# Patient Record
Sex: FEMALE | Race: WHITE | NOT HISPANIC OR LATINO | URBAN - METROPOLITAN AREA
[De-identification: names, ages, dates, MRNs, and addresses within clinical notes are randomized per-mention and may not be internally consistent; named-entity substitution may affect disease eponyms.]

---

## 2021-10-04 ENCOUNTER — PREPPED CHART (OUTPATIENT)
Dept: URBAN - METROPOLITAN AREA CLINIC 20 | Facility: CLINIC | Age: 71
End: 2021-10-04

## 2022-04-07 ASSESSMENT — TONOMETRY
OS_IOP_MMHG: 9
OD_IOP_MMHG: 8

## 2022-04-07 ASSESSMENT — VISUAL ACUITY
OS_SC: 20/20
OD_SC: 20/20

## 2022-04-11 ENCOUNTER — CONSULTATION/EVALUATION (OUTPATIENT)
Dept: URBAN - METROPOLITAN AREA CLINIC 20 | Facility: CLINIC | Age: 72
End: 2022-04-11

## 2022-04-11 DIAGNOSIS — H16.223: ICD-10-CM

## 2022-04-11 PROCEDURE — 99214 OFFICE O/P EST MOD 30 MIN: CPT

## 2022-04-11 ASSESSMENT — VISUAL ACUITY
OD_SC: 20/25+2
OS_SC: 20/20

## 2022-04-11 ASSESSMENT — TONOMETRY
OS_IOP_MMHG: 10
OD_IOP_MMHG: 9

## 2022-08-01 NOTE — PATIENT DISCUSSION
Pt advised to avoid CL wear for 1 week and to throw out old CL's and case.  Given a new case and CL trial.

## 2022-08-19 NOTE — PATIENT DISCUSSION
Gave pt -6.75 sph OU in [employer]  due to pt having a hard time accommodating to the modify mono vision. Told pt to call us back later this week and to give us an update. - Kusum 8/31/22.

## 2023-01-30 ENCOUNTER — FOLLOW UP (OUTPATIENT)
Dept: URBAN - METROPOLITAN AREA CLINIC 20 | Facility: CLINIC | Age: 73
End: 2023-01-30

## 2023-01-30 DIAGNOSIS — H26.493: ICD-10-CM

## 2023-01-30 PROCEDURE — 92014 COMPRE OPH EXAM EST PT 1/>: CPT

## 2023-01-30 ASSESSMENT — TONOMETRY
OD_IOP_MMHG: 11
OS_IOP_MMHG: 10

## 2023-01-30 ASSESSMENT — KERATOMETRY
OS_AXISANGLE_DEGREES: 34
OS_AXISANGLE2_DEGREES: 124
OD_AXISANGLE_DEGREES: 0
OS_K2POWER_DIOPTERS: 44
OD_AXISANGLE2_DEGREES: 90
OD_K1POWER_DIOPTERS: 44
OD_K2POWER_DIOPTERS: 44
OS_K1POWER_DIOPTERS: 43.75

## 2023-01-30 ASSESSMENT — VISUAL ACUITY
OS_SC: 20/20
OU_SC: 20/20-1
OD_SC: 20/20-1

## 2023-07-24 ENCOUNTER — FOLLOW UP (OUTPATIENT)
Dept: URBAN - METROPOLITAN AREA CLINIC 20 | Facility: CLINIC | Age: 73
End: 2023-07-24

## 2023-07-24 DIAGNOSIS — H43.813: ICD-10-CM

## 2023-07-24 DIAGNOSIS — H16.223: ICD-10-CM

## 2023-07-24 DIAGNOSIS — H35.363: ICD-10-CM

## 2023-07-24 PROCEDURE — 99213 OFFICE O/P EST LOW 20 MIN: CPT

## 2023-07-24 RX ORDER — CARBOXYMETHYLCELLULOSE SODIUM 10 MG/ML: 1 GEL OPHTHALMIC

## 2023-07-24 ASSESSMENT — KERATOMETRY
OD_K2POWER_DIOPTERS: 44
OS_K2POWER_DIOPTERS: 44
OS_AXISANGLE2_DEGREES: 124
OD_AXISANGLE_DEGREES: 0
OS_K1POWER_DIOPTERS: 43.75
OS_AXISANGLE_DEGREES: 34
OD_K1POWER_DIOPTERS: 44
OD_AXISANGLE2_DEGREES: 90

## 2023-07-24 ASSESSMENT — VISUAL ACUITY
OS_SC: 20/40
OD_SC: 20/25

## 2023-07-24 ASSESSMENT — TONOMETRY
OS_IOP_MMHG: 15
OD_IOP_MMHG: 13

## 2023-09-25 ENCOUNTER — FOLLOW UP (OUTPATIENT)
Dept: URBAN - METROPOLITAN AREA CLINIC 20 | Facility: CLINIC | Age: 73
End: 2023-09-25

## 2023-09-25 DIAGNOSIS — H16.223: ICD-10-CM

## 2023-09-25 PROCEDURE — 92014 COMPRE OPH EXAM EST PT 1/>: CPT

## 2023-09-25 RX ORDER — CYCLOSPORINE 0 MG/ML: 1 SOLUTION/ DROPS OPHTHALMIC; TOPICAL TWICE A DAY

## 2023-09-25 ASSESSMENT — VISUAL ACUITY
OS_GLARE: 20/60
OS_SC: 20/40-1
OD_SC: 20/25-1

## 2023-09-25 ASSESSMENT — KERATOMETRY
OD_AXISANGLE_DEGREES: 0
OD_K2POWER_DIOPTERS: 44
OS_K1POWER_DIOPTERS: 43.75
OS_AXISANGLE2_DEGREES: 124
OD_K1POWER_DIOPTERS: 44
OD_AXISANGLE2_DEGREES: 90
OS_K2POWER_DIOPTERS: 44
OS_AXISANGLE_DEGREES: 34

## 2023-09-25 ASSESSMENT — TONOMETRY
OD_IOP_MMHG: 9
OS_IOP_MMHG: 10

## 2023-11-29 ASSESSMENT — KERATOMETRY
OD_K1POWER_DIOPTERS: 44
OS_K2POWER_DIOPTERS: 44
OS_K1POWER_DIOPTERS: 43.75
OS_AXISANGLE_DEGREES: 34
OD_AXISANGLE_DEGREES: 0
OD_AXISANGLE2_DEGREES: 90
OS_AXISANGLE2_DEGREES: 124
OD_K2POWER_DIOPTERS: 44

## 2023-11-30 ENCOUNTER — FOLLOW UP (OUTPATIENT)
Dept: URBAN - METROPOLITAN AREA CLINIC 20 | Facility: CLINIC | Age: 73
End: 2023-11-30

## 2023-11-30 DIAGNOSIS — H16.223: ICD-10-CM

## 2023-11-30 PROCEDURE — 99214 OFFICE O/P EST MOD 30 MIN: CPT

## 2023-11-30 ASSESSMENT — TONOMETRY
OS_IOP_MMHG: 13
OD_IOP_MMHG: 14

## 2023-11-30 ASSESSMENT — VISUAL ACUITY
OD_SC: 20/20-1
OS_SC: 20/25+1

## 2024-09-06 ENCOUNTER — COMPREHENSIVE EXAM (OUTPATIENT)
Dept: URBAN - METROPOLITAN AREA CLINIC 20 | Facility: CLINIC | Age: 74
End: 2024-09-06

## 2024-09-06 DIAGNOSIS — H35.363: ICD-10-CM

## 2024-09-06 DIAGNOSIS — H16.223: ICD-10-CM

## 2024-09-06 PROCEDURE — 92134 CPTRZ OPH DX IMG PST SGM RTA: CPT

## 2024-09-06 PROCEDURE — 99214 OFFICE O/P EST MOD 30 MIN: CPT
